# Patient Record
Sex: FEMALE | Race: WHITE | NOT HISPANIC OR LATINO | Employment: UNEMPLOYED | URBAN - METROPOLITAN AREA
[De-identification: names, ages, dates, MRNs, and addresses within clinical notes are randomized per-mention and may not be internally consistent; named-entity substitution may affect disease eponyms.]

---

## 2021-10-18 ENCOUNTER — HOSPITAL ENCOUNTER (OUTPATIENT)
Dept: ORTHOPEDICS | Facility: HOSPITAL | Age: 36
End: 2021-10-21
Attending: INTERNAL MEDICINE | Admitting: INTERNAL MEDICINE

## 2021-10-18 LAB
ABS NEUT (OLG): 7.15 X10(3)/MCL (ref 2.1–9.2)
ALBUMIN SERPL-MCNC: 3.9 GM/DL (ref 3.5–5)
ALBUMIN/GLOB SERPL: 0.9 RATIO (ref 1.1–2)
ALP SERPL-CCNC: 74 UNIT/L (ref 40–150)
ALT SERPL-CCNC: 14 UNIT/L (ref 0–55)
AST SERPL-CCNC: 19 UNIT/L (ref 5–34)
BASOPHILS # BLD AUTO: 0.05 X10(3)/MCL (ref 0–0.2)
BASOPHILS NFR BLD AUTO: 0.5 % (ref 0–1)
BILIRUB SERPL-MCNC: 0.3 MG/DL (ref 0.2–1.2)
BILIRUBIN DIRECT+TOT PNL SERPL-MCNC: 0.1 MG/DL (ref 0–0.8)
BILIRUBIN DIRECT+TOT PNL SERPL-MCNC: 0.2 MG/DL (ref 0–0.5)
BUN SERPL-MCNC: 12.6 MG/DL (ref 7–18.7)
CALCIUM SERPL-MCNC: 10.2 MG/DL (ref 8.4–10.2)
CHLORIDE SERPL-SCNC: 103 MMOL/L (ref 98–107)
CO2 SERPL-SCNC: 25 MMOL/L (ref 22–29)
CREAT SERPL-MCNC: 1.06 MG/DL (ref 0.57–1.11)
EOSINOPHIL # BLD AUTO: 0.23 X10(3)/MCL (ref 0–0.9)
EOSINOPHIL NFR BLD AUTO: 2.4 % (ref 0–6.4)
ERYTHROCYTE [DISTWIDTH] IN BLOOD BY AUTOMATED COUNT: 15.2 % (ref 11.5–17)
GLOBULIN SER-MCNC: 4.5 GM/DL (ref 2.4–3.5)
GLUCOSE SERPL-MCNC: 84 MG/DL (ref 74–100)
HCT VFR BLD AUTO: 40.2 % (ref 37–47)
HGB BLD-MCNC: 12.6 GM/DL (ref 12–16)
IMM GRANULOCYTES # BLD AUTO: 0.03 10*3/UL (ref 0–0.02)
IMM GRANULOCYTES NFR BLD AUTO: 0.3 % (ref 0–0.43)
LACTATE SERPL-SCNC: 1.4 MMOL/L (ref 0.5–2.2)
LYMPHOCYTES # BLD AUTO: 1.89 X10(3)/MCL (ref 0.6–4.6)
LYMPHOCYTES NFR BLD AUTO: 19.4 % (ref 16–44)
MCH RBC QN AUTO: 27.7 PG (ref 27–31)
MCHC RBC AUTO-ENTMCNC: 31.3 GM/DL (ref 33–36)
MCV RBC AUTO: 88.4 FL (ref 80–94)
MONOCYTES # BLD AUTO: 0.4 X10(3)/MCL (ref 0.1–1.3)
MONOCYTES NFR BLD AUTO: 4.1 % (ref 4–12.1)
NEUTROPHILS # BLD AUTO: 7.15 X10(3)/MCL (ref 2.1–9.2)
NEUTROPHILS NFR BLD AUTO: 73.3 % (ref 43–73)
NRBC BLD AUTO-RTO: 0 % (ref 0–0.2)
PLATELET # BLD AUTO: 319 X10(3)/MCL (ref 130–400)
PMV BLD AUTO: 10.2 FL (ref 7.4–10.4)
POTASSIUM SERPL-SCNC: 4 MMOL/L (ref 3.5–5.1)
PROT SERPL-MCNC: 8.4 GM/DL (ref 6.4–8.3)
RBC # BLD AUTO: 4.55 X10(6)/MCL (ref 4.2–5.4)
SARS-COV-2 AG RESP QL IA.RAPID: NEGATIVE
SODIUM SERPL-SCNC: 138 MMOL/L (ref 136–145)
WBC # SPEC AUTO: 9.8 X10(3)/MCL (ref 4.5–11.5)

## 2021-10-20 LAB
BUN SERPL-MCNC: 12.3 MG/DL (ref 7–18.7)
CREAT SERPL-MCNC: 0.86 MG/DL (ref 0.57–1.11)
EST CREAT CLEARANCE SER (OHS): 93.27 ML/MIN
VANCOMYCIN TROUGH SERPL-MCNC: 18 UG/ML (ref 5–10)

## 2021-10-23 LAB
FINAL CULTURE: NORMAL
FINAL CULTURE: NORMAL

## 2022-04-30 NOTE — ED PROVIDER NOTES
Patient:   Jovon Ramirez            MRN: [id number]            FIN: [id number]               Age:   36 years     Sex:  Female     :  1985   Associated Diagnoses:   Cellulitis of left lower extremity   Author:   Suyapa Ludwig MD      Basic Information   Additional information: Chief Complaint from Nursing Triage Note : Chief Complaint   10/18/2021 15:18 CDT     Chief Complaint           pt c/o pain and redness to left lower leg onsete this morning.  .      History of Present Illness   The patient presents with cellulitis and Ms Ramirez is a 36-year-old female complaining of recurrent cellulitis of her left lower leg.  I saw her in 2021 for this problem, and she had taken a course of Bactrim with some improvement and I gave her clindamycin which improved it further but it never completely resolved.  She states she stayed with a low level of infection to her LLL since March, until today when it flared up and became severely red and painful again.  She has no fever but states she is just not felt well in general.  She denies any injury to the area.  The onset was 1  days ago.  The course/duration of symptoms is constant.  Location: Left lower extremity. The character of symptoms is pain, redness and swelling.  The degree of symptoms is severe.  Risk factors consist of Obesity.  Prior episodes: frequent.  Therapy today: none.  Associated symptoms: none.        Review of Systems   Musculoskeletal symptoms:  LLL pain, redness and swelling.             Additional review of systems information: All other systems reviewed and otherwise negative.      Health Status   Allergies:    Allergic Reactions (Selected)  No Known Allergies,    Allergies (1) Active Reaction  No Known Allergies None Documented  .   Medications:  (Selected)   Documented Medications  Documented  Latuda 60 mg oral tablet: 60 mg = 1 tab(s), Oral, qPM.      Past Medical/ Family/ Social History   Medical history:     Active  Psoriasis (44860676)  Seizure disorder ([id number]), Reviewed as documented in chart.   Surgical history:    ear sx x 3.  eye x 2.  cholecystectomy.  tonsils and adenoids..  renal stent w/ lithotripsy., Reviewed as documented in chart.   Family history: Not significant.   Social history: Tobacco use: Regularly.   Problem list:    Active Problems (6)  Arthritis   Morbid obesity   Psoriasis   Seizure   Seizure disorder   Tobacco user   , per nurse's notes.      Physical Examination               Vital Signs   Vital Signs   10/18/2021 15:18 CDT     Temperature Temporal Artery               36.8 DegC                             Peripheral Pulse Rate     81 bpm                             Respiratory Rate          20 br/min                             SpO2                      98 %                             Oxygen Therapy            Room air                             Systolic Blood Pressure   138 mmHg                             Diastolic Blood Pressure  79 mmHg  .      Vital Signs (last 24 hrs)_____  Last Charted___________  Heart Rate Peripheral   81 bpm  (OCT 18 15:18)  Resp Rate         20 br/min  (OCT 18 15:18)  SBP      138 mmHg  (OCT 18 15:18)  DBP      79 mmHg  (OCT 18 15:18)  SpO2      98 %  (OCT 18 15:18)  .   Measurements   10/18/2021 15:18 CDT     Weight Dosing             150 kg                             Weight Measured and Calculated in Lbs     330.69 lb                             Weight Estimated          150 kg                             Height/Length Dosing      175 cm                             Height/Length Estimated   175 cm                             Body Mass Index Estimated 48.98 kg/m2  .   Oxygen saturation.   General:  Alert, no acute distress.    Skin:  Warm, dry, pink, intact.    Head:  Normocephalic, atraumatic.    Neck:  No JVD.   Eye   Ears, nose, mouth and throat:  Oral mucosa moist.   Cardiovascular   Respiratory:  Respirations are non-labored.   Musculoskeletal:  Bright  red erythema, heat, swelling to the left anterior shin from the ankle to just below the tibial tuberosity.  Erythema does not extend to the posterior calf.  She has areas of scar tissue in the left upper leg from previous healed infection.  No palpable abscess or foreign body.  Pedal pulses 2+ and shows no sign of infection to her foot with brisk capillary refill to the toes.  Negative Homans sign and no palpable no sign of DVT..   Neurological:  Alert and oriented to person, place, time, and situation.   Psychiatric:  Cooperative.      Medical Decision Making   Documents reviewed:  Emergency department nurses' notes.   Orders  Launch Orders   Laboratory:  UPT - Urine Pregancy Test (Order): Stat collect, Urine, 10/18/2021 16:05 CDT by ~;N, Nurse collect, 10/18/2021 16:05 CDT  CMP (Order): Stat collect, 10/18/2021 16:05 CDT, Blood, Lab Collect, 10/18/2021 16:05 CDT  CBC w/ Auto Diff (Order): Now collect, 10/18/2021 16:05 CDT, Blood, Lab Collect, 10/18/2021 16:05 CDT  Lactic Acid (Order): Stat collect, 10/18/2021 16:04 CDT, Blood, Lab Collect, 10/18/2021 16:04 CDT  Blood Culture (Order): 10/18/2021 16:04 CDT, Stat collect, Blood  Blood Culture (Order): 10/18/2021 16:04 CDT, Stat collect, Blood  Patient Care:  Saline Lock Insert (Order): 10/18/2021 16:05 CDT  Radiology:  XR Tibia-Fibula Left 2 Views (Order): Stat, 10/18/2021 16:05 CDT, Inflammation, None, Stretcher, Rad Type, Not Scheduled, Launch Orders   Pharmacy:  Zosyn 3 g-0.375 g/50 mL intravenous solution (Order): 3 gm, form: Infusion Skin/Skin structure infections, IV Piggyback, q6hr, first dose 10/18/2021 17:00 CDT.    Results review:  Lab results : Lab View   10/18/2021 18:06 CDT     COVID-19 Rapid            NEGATIVE    10/18/2021 17:25 CDT     Est Creat Clearance Ser   75.67 mL/min    10/18/2021 16:50 CDT     Sodium Lvl                138 mmol/L                             Potassium Lvl             4.0 mmol/L                             Chloride                   103 mmol/L                             CO2                       25 mmol/L                             Calcium Lvl               10.2 mg/dL                             Glucose Lvl               84 mg/dL                             BUN                       12.6 mg/dL                             Creatinine                1.06 mg/dL                             eGFR-AA                   >60  NA                             eGFR-CRESCENCIO                  >60 mL/min/1.73 m2  NA                             Bili Total                0.3 mg/dL                             Bili Direct               0.2 mg/dL                             Bili Indirect             0.10 mg/dL                             AST                       19 unit/L                             ALT                       14 unit/L                             Alk Phos                  74 unit/L                             Total Protein             8.4 gm/dL  HI                             Albumin Lvl               3.9 gm/dL                             Globulin                  4.5 gm/dL  HI                             A/G Ratio                 0.9 ratio  LOW                             Lactic Acid Lvl           1.4 mmol/L                             WBC                       9.8 x10(3)/mcL                             RBC                       4.55 x10(6)/mcL                             Hgb                       12.6 gm/dL                             Hct                       40.2 %                             Platelet                  319 x10(3)/mcL                             MCV                       88.4 fL                             MCH                       27.7 pg                             MCHC                      31.3 gm/dL  LOW                             RDW                       15.2 %                             MPV                       10.2 fL                             Abs Neut                  7.15 x10(3)/mcL                              Neutro Auto               73.3 %  HI                             Lymph Auto                19.4 %                             Mono Auto                 4.1 %                             Eos Auto                  2.4 %                             Abs Eos                   0.23 x10(3)/mcL                             Basophil Auto             0.5 %                             Abs Neutro                7.15 x10(3)/mcL                             Abs Lymph                 1.89 x10(3)/mcL                             Abs Mono                  0.40 x10(3)/mcL                             Abs Baso                  0.05 x10(3)/mcL                             NRBC%                     0.0 %                             IG%                       0.300 %                             IG#                       0.0300  HI  .   Radiology results:  Xray Tib Fib:  No subcu emphysema or bony abnormality.      Impression and Plan   Diagnosis   Cellulitis of left lower extremity ([id number]-NX L03.116)      Calls-Consults   -  Mike CARTAGENA, Zoltan RIVERA, Admit to the Hospitalist Service for IV antibiotics due to the severity of the infection.    Plan   Condition: Stable.    Disposition: Admit time  10/18/2021 18:06:00, Admit to Inpatient Unit.    Counseled: Family, Regarding diagnosis, Regarding diagnostic results, Regarding treatment plan, Patient indicated understanding of instructions.

## 2022-04-30 NOTE — H&P
Patient:   Jovon Ramirez            MRN: [id number]            FIN: [id number]               Age:   36 years     Sex:  Female     :  1985   Associated Diagnoses:   Seizure; Morbid obesity; Cellulitis of left lower extremity   Author:   Edvin Watts MD      Basic Information   Admit information:  A 36 year old female seen [facility] with a nurse bedside and informed consent with c/o l leg wound along with increased redness to the l leg along with warmth and pain was triaged and evaluated in the er and admitted to acute care for cellulitis of l leg .    Source of history:  Self.    Referral source:  Self.    History limitation:  None.       Chief Complaint   10/18/2021 15:18 CDT     pt c/o pain and redness to left lower leg onsete this morning.        Review of Systems   Constitutional:  Weakness, Fatigue, Decreased activity.    Eye:  Negative.    Ear/Nose/Mouth/Throat:  Negative.    Respiratory:  Negative.    Cardiovascular:  Negative.    Gastrointestinal:  Negative.    Genitourinary:  Negative.    Hematology/Lymphatics:  Negative.    Endocrine:  Negative.    Immunologic:  Recurrent infections, Malaise.    Musculoskeletal:  Muscle pain, Decreased range of motion.    Integumentary:  Skin lesion, redness warmth .    Neurologic:  Negative.    Psychiatric:  Negative.       Health Status   Allergies:    Allergic Reactions (All)  No Known Allergies,    Allergies (1) Active Reaction  No Known Allergies None Documented     Current medications:  (Selected)   Inpatient Medications  Ordered  Zosyn 3.375 gm (for IVPB): 3.375 gm, form: Injection Skin/Skin structure infections, IV Piggyback, q8hr, Infuse over: 4 hr, first dose 10/18/21 17:00:00 CDT  Documented Medications  Documented  Latuda 60 mg oral tablet: 60 mg = 1 tab(s), Oral, qPM,    Medications (1) Active  Scheduled: (1)  piperacillin-tazobactam  3.375 gm 1 EA, IV Piggyback, q8hr  Continuous: (0)  PRN: (0)     Problem list:    All  Problems  Arthritis / SNOMED CT 7581301 / Confirmed  Morbid obesity / SNOMED CT 001897227 / Probable  Psoriasis / SNOMED CT 43609941 / Confirmed  Seizure / SNOMED CT 008069028 / Confirmed  Seizure disorder / SNOMED CT 426567867 / Confirmed  Tobacco user / SNOMED CT 791228564 / Confirmed,    Active Problems (6)  Arthritis   Morbid obesity   Psoriasis   Seizure   Seizure disorder   Tobacco user         Histories   Past Medical History:    Active  Psoriasis (19392443)  Seizure disorder (195008107)   Family History:    No family history items have been selected or recorded.   Procedure history:    ear sx x 3.  eye x 2.  cholecystectomy.  tonsils and adenoids..  renal stent w/ lithotripsy.   Social History        Social & Psychosocial Habits    Alcohol  04/22/2018  Use: Never    Substance Use  04/22/2018  Use: Never    07/07/2019  Type: Marijuana, Methamphetamines    Frequency: 3-5 times per week    08/22/2019  Use: Current    Type: Marijuana    Frequency: 3-5 times per week    Tobacco  04/22/2018  Use: Current every day smoker    Type: Cigarettes    Tobacco use per day: 20    05/26/2019  Use: 10 or more cigarettes (1/    Type: Cigarettes    Patient Wants Consult For Cessation Counseling N/A    08/22/2019  Use: Never (less than 100 in l    Patient Wants Consult For Cessation Counseling No    11/05/2019  Use: 10 or more cigarettes (1/    Patient Wants Consult For Cessation Counseling No    02/05/2021  Use: 10 or more cigarettes (1/    Patient Wants Consult For Cessation Counseling No    03/02/2021  Use: 10 or more cigarettes (1/    Patient Wants Consult For Cessation Counseling No    10/18/2021  Use: Never (less than 100 in l    Patient Wants Consult For Cessation Counseling N/A    Abuse/Neglect  07/07/2019  SHX Any signs of abuse or neglect No    08/22/2019  SHX Any signs of abuse or neglect No    11/05/2019  SHX Any signs of abuse or neglect No    02/05/2021  SHX Any signs of abuse or neglect No    Feels unsafe at home:  No    Safe place to go: Yes    03/02/2021  SHX Any signs of abuse or neglect No    Feels unsafe at home: No    Safe place to go: Yes    10/18/2021  SHX Any signs of abuse or neglect No    Feels unsafe at home: No    Safe place to go: Yes  .        Physical Examination   General:  Alert and oriented.    Eye:  Pupils are equal, round and reactive to light.    HENT:  Normocephalic.    Neck:  Supple.    Respiratory:  Lungs are clear to auscultation.    Cardiovascular:  Normal rate.    Gastrointestinal:  Soft.    Vital Signs   10/18/2021 18:57 CDT     Temperature Oral          36.9 DegC                             Temperature Oral (calculated)             98.42 DegF                             Peripheral Pulse Rate     70 bpm                             Respiratory Rate          20 br/min                             SpO2                      100 %                             Systolic Blood Pressure   110 mmHg                             Diastolic Blood Pressure  62 mmHg    10/18/2021 15:18 CDT     Temperature Temporal Artery               36.8 DegC                             Peripheral Pulse Rate     81 bpm                             Respiratory Rate          20 br/min                             SpO2                      98 %                             Oxygen Therapy            Room air                             Systolic Blood Pressure   138 mmHg                             Diastolic Blood Pressure  79 mmHg        Vital Signs (last 24 hrs)_____  Last Charted___________  Temp Oral     36.9 DegC  (OCT 18 18:57)  Heart Rate Peripheral   70 bpm  (OCT 18 18:57)  Resp Rate         20 br/min  (OCT 18 18:57)  SBP      110 mmHg  (OCT 18 18:57)  DBP      62 mmHg  (OCT 18 18:57)  SpO2      100 %  (OCT 18 18:57)     Genitourinary:  No costovertebral angle tenderness.    Lymphatics:  No lymphadenopathy neck, axilla, groin.    Musculoskeletal:  Normal range of motion.    Integumentary:  redness wramth swelling l leg.     Neurologic:  Alert.    Cognition and Speech:  Oriented.    Psychiatric:  Cooperative.       Review / Management   Laboratory Results   Today's Lab Results : PowerNote Discrete Results   10/18/2021 17:25 CDT     Est Creat Clearance Ser   75.67 mL/min    10/18/2021 16:50 CDT     WBC                       9.8 x10(3)/mcL                             RBC                       4.55 x10(6)/mcL                             Hgb                       12.6 gm/dL                             Hct                       40.2 %                             Platelet                  319 x10(3)/mcL                             MCV                       88.4 fL                             MCH                       27.7 pg                             MCHC                      31.3 gm/dL  LOW                             RDW                       15.2 %                             MPV                       10.2 fL                             Abs Neut                  7.15 x10(3)/mcL                             Neutro Auto               73.3 %  HI                             Lymph Auto                19.4 %                             Mono Auto                 4.1 %                             Eos Auto                  2.4 %                             Abs Eos                   0.23 x10(3)/mcL                             Basophil Auto             0.5 %                             Abs Neutro                7.15 x10(3)/mcL                             Abs Lymph                 1.89 x10(3)/mcL                             Abs Mono                  0.40 x10(3)/mcL                             Abs Baso                  0.05 x10(3)/mcL                             NRBC%                     0.0 %                             IG%                       0.300 %                             IG#                       0.0300  HI                             Sodium Lvl                138 mmol/L                             Potassium Lvl             4.0 mmol/L                              Chloride                  103 mmol/L                             CO2                       25 mmol/L                             Calcium Lvl               10.2 mg/dL                             Glucose Lvl               84 mg/dL                             BUN                       12.6 mg/dL                             Creatinine                1.06 mg/dL                             eGFR-AA                   >60  NA                             eGFR-CRESCENCIO                  >60 mL/min/1.73 m2  NA                             Bili Total                0.3 mg/dL                             Bili Direct               0.2 mg/dL                             Bili Indirect             0.10 mg/dL                             AST                       19 unit/L                             ALT                       14 unit/L                             Alk Phos                  74 unit/L                             Total Protein             8.4 gm/dL  HI                             Albumin Lvl               3.9 gm/dL                             Globulin                  4.5 gm/dL  HI                             A/G Ratio                 0.9 ratio  LOW                             Lactic Acid Lvl           1.4 mmol/L        Radiology results   Rad Results (ST)   Accession: [id number]  Order: XR Tibia-Fibula Left 2 Views  Report Dt/Tm: 10/18/2021 16:32  Report:   Clinical History:  Inflammation     Technique:  2 views of the left tibia and fibula.     Comparison:  No prior imaging available for comparison.     Findings:  No acute osseous abnormality. No cyst displaced fracture. No  significant degenerative change. Soft tissue edema is noted with  prominent soft tissues.     Impression:  Soft tissue edema with no underlying osseous abnormality.       Condition:  Stable.       Impression and Plan   Diagnosis     Seizure ([id number] R56.9).     Morbid obesity (NJX88-IT E66.01).     Cellulitis of left lower extremity  (LBP00-RD L03.116).     Course:  Progressing as expected.    Orders      (Selected)   Inpatient Orders  Ordered  Admit as Inpatient: 10/18/21 18:06:00 CDT, Medical Unit Mike CARTAGENA, Ashly Klein, [facility]  Capacity Management Bed Request: 10/18/21 18:06:30 CDT, Medical Unit  Discharge Planning Ongoing Assessment: 10/21/21 9:00:00 CDT, q3day  Immunizations Quality Measures: 10/18/21 18:15:35 CDT, qShift  Patient Isolation: 10/18/21 18:05:36 CDT, Contact Precautions, Constant Indicator  Patient Isolation: 10/18/21 18:05:36 CDT, Droplet Precautions, Constant Indicator  Saline Lock Insert: 10/18/21 16:05:00 CDT, Stop date 10/18/21 16:05:00 CDT  VTE Quality Measures: 10/18/21 18:15:35 CDT, qShift  Zosyn 3.375 gm (for IVPB): 3.375 gm, form: Injection Skin/Skin structure infections, IV Piggyback, q8hr, Infuse over: 4 hr, first dose 10/18/21 17:00:00 CDT  Ordered (Dispatched)  UPT - Urine Pregancy Test: Stat collect, Urine, 10/18/21 16:05:00 CDT by ~;N, Stop date 10/18/21 16:05:00 CDT, Nurse collect, 10/18/21 16:05:00 CDT  Ordered (In Transit)  Blood Culture: 10/18/21 16:04:00 CDT, Stat collect, Blood, Stop date 10/18/21 16:05:00 CDT  Blood Culture: 10/18/21 16:04:00 CDT, Stat collect, Blood, Stop date 10/18/21 16:05:00 CDT.     Education and Follow-up:          Counseled: Patient, Regarding diagnosis, Regarding treatment, Regarding medications.         Discharge Planning: Plan to discharge ( To home ), Cellulitis, Adult.       Quality Measures

## 2022-07-21 ENCOUNTER — HOSPITAL ENCOUNTER (EMERGENCY)
Facility: HOSPITAL | Age: 37
Discharge: HOME | End: 2022-07-21
Attending: FAMILY MEDICINE
Payer: MEDICAID

## 2022-07-21 VITALS
TEMPERATURE: 98 F | RESPIRATION RATE: 18 BRPM | WEIGHT: 293 LBS | HEIGHT: 69 IN | SYSTOLIC BLOOD PRESSURE: 130 MMHG | DIASTOLIC BLOOD PRESSURE: 90 MMHG | HEART RATE: 84 BPM | BODY MASS INDEX: 43.4 KG/M2 | OXYGEN SATURATION: 97 %

## 2022-07-21 DIAGNOSIS — L03.116 CELLULITIS OF LEFT LOWER EXTREMITY: Primary | ICD-10-CM

## 2022-07-21 LAB
ALBUMIN SERPL-MCNC: 3.6 GM/DL (ref 3.5–5)
ALBUMIN/GLOB SERPL: 0.9 RATIO (ref 1.1–2)
ALP SERPL-CCNC: 70 UNIT/L (ref 40–150)
ALT SERPL-CCNC: 34 UNIT/L (ref 0–55)
AMORPH URATE CRY URNS QL MICRO: ABNORMAL /HPF
APPEARANCE UR: ABNORMAL
AST SERPL-CCNC: 33 UNIT/L (ref 5–34)
B-HCG SERPL QL: NEGATIVE
BACTERIA #/AREA URNS AUTO: ABNORMAL /HPF
BASOPHILS # BLD AUTO: 0.04 X10(3)/MCL (ref 0–0.2)
BASOPHILS NFR BLD AUTO: 0.4 %
BILIRUB UR QL STRIP.AUTO: NEGATIVE MG/DL
BILIRUBIN DIRECT+TOT PNL SERPL-MCNC: 0.3 MG/DL
BNP BLD-MCNC: 15.1 PG/ML
BUN SERPL-MCNC: 9.1 MG/DL (ref 7–18.7)
CALCIUM SERPL-MCNC: 9.7 MG/DL (ref 8.4–10.2)
CAOX CRY URNS QL MICRO: ABNORMAL /HPF
CHLORIDE SERPL-SCNC: 107 MMOL/L (ref 98–107)
CO2 SERPL-SCNC: 24 MMOL/L (ref 22–29)
COLOR UR AUTO: YELLOW
CREAT SERPL-MCNC: 1.07 MG/DL (ref 0.55–1.02)
EOSINOPHIL # BLD AUTO: 0.23 X10(3)/MCL (ref 0–0.9)
EOSINOPHIL NFR BLD AUTO: 2.6 %
ERYTHROCYTE [DISTWIDTH] IN BLOOD BY AUTOMATED COUNT: 15.2 % (ref 11.5–17)
GLOBULIN SER-MCNC: 4.1 GM/DL (ref 2.4–3.5)
GLUCOSE SERPL-MCNC: 81 MG/DL (ref 74–100)
GLUCOSE UR QL STRIP.AUTO: NEGATIVE MG/DL
HCT VFR BLD AUTO: 34.4 % (ref 37–47)
HGB BLD-MCNC: 10.7 GM/DL (ref 12–16)
IMM GRANULOCYTES # BLD AUTO: 0.05 X10(3)/MCL (ref 0–0.04)
IMM GRANULOCYTES NFR BLD AUTO: 0.6 %
KETONES UR QL STRIP.AUTO: NEGATIVE MG/DL
LEUKOCYTE ESTERASE UR QL STRIP.AUTO: ABNORMAL UNIT/L
LYMPHOCYTES # BLD AUTO: 1.87 X10(3)/MCL (ref 0.6–4.6)
LYMPHOCYTES NFR BLD AUTO: 20.9 %
MCH RBC QN AUTO: 28.2 PG (ref 27–31)
MCHC RBC AUTO-ENTMCNC: 31.1 MG/DL (ref 33–36)
MCV RBC AUTO: 90.5 FL (ref 80–94)
MONOCYTES # BLD AUTO: 0.43 X10(3)/MCL (ref 0.1–1.3)
MONOCYTES NFR BLD AUTO: 4.8 %
MUCOUS THREADS URNS QL MICRO: ABNORMAL /LPF
NEUTROPHILS # BLD AUTO: 6.3 X10(3)/MCL (ref 2.1–9.2)
NEUTROPHILS NFR BLD AUTO: 70.7 %
NITRITE UR QL STRIP.AUTO: NEGATIVE
NRBC BLD AUTO-RTO: 0 %
PH UR STRIP.AUTO: 6 [PH]
PLATELET # BLD AUTO: 339 X10(3)/MCL (ref 130–400)
PMV BLD AUTO: 10.2 FL (ref 7.4–10.4)
POTASSIUM SERPL-SCNC: 3.9 MMOL/L (ref 3.5–5.1)
PROT SERPL-MCNC: 7.7 GM/DL (ref 6.4–8.3)
PROT UR QL STRIP.AUTO: NEGATIVE MG/DL
RBC # BLD AUTO: 3.8 X10(6)/MCL (ref 4.2–5.4)
RBC #/AREA URNS AUTO: ABNORMAL /HPF
RBC UR QL AUTO: NEGATIVE UNIT/L
SODIUM SERPL-SCNC: 143 MMOL/L (ref 136–145)
SP GR UR STRIP.AUTO: >=1.03
SQUAMOUS #/AREA URNS AUTO: ABNORMAL /HPF
UROBILINOGEN UR STRIP-ACNC: 1 MG/DL
WBC # SPEC AUTO: 9 X10(3)/MCL (ref 4.5–11.5)
WBC #/AREA URNS AUTO: ABNORMAL /HPF

## 2022-07-21 PROCEDURE — 81025 URINE PREGNANCY TEST: CPT | Performed by: FAMILY MEDICINE

## 2022-07-21 PROCEDURE — 85025 COMPLETE CBC W/AUTO DIFF WBC: CPT | Performed by: FAMILY MEDICINE

## 2022-07-21 PROCEDURE — 96372 THER/PROPH/DIAG INJ SC/IM: CPT | Performed by: FAMILY MEDICINE

## 2022-07-21 PROCEDURE — 81001 URINALYSIS AUTO W/SCOPE: CPT | Performed by: FAMILY MEDICINE

## 2022-07-21 PROCEDURE — 63600175 PHARM REV CODE 636 W HCPCS: Performed by: FAMILY MEDICINE

## 2022-07-21 PROCEDURE — 99284 EMERGENCY DEPT VISIT MOD MDM: CPT | Mod: 25

## 2022-07-21 PROCEDURE — 80053 COMPREHEN METABOLIC PANEL: CPT | Performed by: FAMILY MEDICINE

## 2022-07-21 PROCEDURE — 83880 ASSAY OF NATRIURETIC PEPTIDE: CPT | Performed by: FAMILY MEDICINE

## 2022-07-21 PROCEDURE — 36415 COLL VENOUS BLD VENIPUNCTURE: CPT | Performed by: FAMILY MEDICINE

## 2022-07-21 RX ORDER — CEFTRIAXONE 1 G/1
1 INJECTION, POWDER, FOR SOLUTION INTRAMUSCULAR; INTRAVENOUS
Status: COMPLETED | OUTPATIENT
Start: 2022-07-21 | End: 2022-07-21

## 2022-07-21 RX ORDER — CEPHALEXIN 500 MG/1
500 CAPSULE ORAL EVERY 8 HOURS
Qty: 30 CAPSULE | Refills: 0 | Status: SHIPPED | OUTPATIENT
Start: 2022-07-21 | End: 2022-07-31

## 2022-07-21 RX ORDER — CLINDAMYCIN PHOSPHATE 150 MG/ML
600 INJECTION, SOLUTION INTRAVENOUS
Status: DISCONTINUED | OUTPATIENT
Start: 2022-07-21 | End: 2022-07-21

## 2022-07-21 RX ORDER — KETOROLAC TROMETHAMINE 30 MG/ML
30 INJECTION, SOLUTION INTRAMUSCULAR; INTRAVENOUS
Status: COMPLETED | OUTPATIENT
Start: 2022-07-21 | End: 2022-07-21

## 2022-07-21 RX ORDER — KETOROLAC TROMETHAMINE 10 MG/1
10 TABLET, FILM COATED ORAL EVERY 6 HOURS PRN
Qty: 20 TABLET | Refills: 0 | Status: SHIPPED | OUTPATIENT
Start: 2022-07-21 | End: 2022-07-26

## 2022-07-21 RX ORDER — CLINDAMYCIN HYDROCHLORIDE 300 MG/1
300 CAPSULE ORAL EVERY 8 HOURS
Qty: 30 CAPSULE | Refills: 0 | Status: SHIPPED | OUTPATIENT
Start: 2022-07-21 | End: 2022-07-31

## 2022-07-21 RX ADMIN — KETOROLAC TROMETHAMINE 30 MG: 30 INJECTION, SOLUTION INTRAMUSCULAR at 05:07

## 2022-07-21 RX ADMIN — CEFTRIAXONE SODIUM 1 G: 1 INJECTION, POWDER, FOR SOLUTION INTRAMUSCULAR; INTRAVENOUS at 05:07

## 2022-07-21 NOTE — ED PROVIDER NOTES
Encounter Date: 7/21/2022       History     Chief Complaint   Patient presents with    Cellulitis     Pt complaint of recurrent redness and swelling to left lower leg with past hx of admission for similar problem in Jan 2022.      37-year-old female with history of seizure disorder presents with nontraumatic left lower leg erythema and swelling for the past 1 week.  Patient is unsure if she was bitten by an insect.  She denies fever or drainage.  Patient states she had to be hospitalized last year for cellulitis.  Denies history of DVT or PE.  Denies chest pain or shortness of breath.  No other complaints.        Review of patient's allergies indicates:  No Known Allergies  Past Medical History:   Diagnosis Date    Kidney stones     Seizures      No past surgical history on file.  No family history on file.  Social History     Tobacco Use    Smoking status: Current Every Day Smoker     Packs/day: 1.00     Types: Cigarettes    Smokeless tobacco: Never Used     Review of Systems   Constitutional: Negative.    HENT: Negative.    Eyes: Negative.    Respiratory: Negative.    Cardiovascular: Negative.    Gastrointestinal: Negative.    Endocrine: Negative.    Genitourinary: Negative.    Musculoskeletal: Negative.    Skin: Negative.    Allergic/Immunologic: Negative.    Neurological: Negative.    Hematological: Negative.    Psychiatric/Behavioral: Negative.        Physical Exam     Initial Vitals   BP Pulse Resp Temp SpO2   07/21/22 1552 07/21/22 1552 07/21/22 1552 07/21/22 1557 07/21/22 1552   (!) 130/90 84 18 98.1 °F (36.7 °C) 97 %      MAP       --                Physical Exam    Nursing note and vitals reviewed.  Constitutional: She appears well-developed and well-nourished.   HENT:   Head: Normocephalic and atraumatic.   Eyes: Pupils are equal, round, and reactive to light.   Neck:   Normal range of motion.  Cardiovascular: Normal rate, regular rhythm and intact distal pulses.   Pulmonary/Chest: Breath sounds  normal.   Abdominal: Abdomen is soft. Bowel sounds are normal.   Musculoskeletal:         General: Normal range of motion.      Cervical back: Normal range of motion.      Comments: Left lower extremity-4 cm area of erythema superior to the ankle which is circumferential consistent with cellulitis.  No obvious abrasions or site for initial infection.  Warm to touch.  Mildly tender to palpation.  Nothing to drain.     Neurological: She is alert and oriented to person, place, and time. She has normal strength. GCS score is 15. GCS eye subscore is 4. GCS verbal subscore is 5. GCS motor subscore is 6.   Skin: Skin is warm. Capillary refill takes less than 2 seconds.   Psychiatric: She has a normal mood and affect.         ED Course   Procedures  Labs Reviewed   URINALYSIS - Abnormal; Notable for the following components:       Result Value    Appearance, UA Hazy (*)     Leukocyte Esterase, UA Trace (*)     All other components within normal limits   COMPREHENSIVE METABOLIC PANEL - Abnormal; Notable for the following components:    Creatinine 1.07 (*)     Globulin 4.1 (*)     Albumin/Globulin Ratio 0.9 (*)     All other components within normal limits   CBC WITH DIFFERENTIAL - Abnormal; Notable for the following components:    RBC 3.80 (*)     Hgb 10.7 (*)     Hct 34.4 (*)     MCHC 31.1 (*)     IG# 0.05 (*)     All other components within normal limits   URINALYSIS, MICROSCOPIC - Abnormal; Notable for the following components:    Bacteria, UA Few (*)     Mucous, UA Small (*)     Amporphous Urate Crystals, UA Few (*)     Calcium Oxalate Crystals, UA Few (*)     WBC, UA 6-10 (*)     Squamous Epithelial Cells, UA Many (*)     All other components within normal limits   PREGNANCY TEST, URINE RAPID - Normal   B-TYPE NATRIURETIC PEPTIDE - Normal   CBC W/ AUTO DIFFERENTIAL    Narrative:     The following orders were created for panel order CBC auto differential.  Procedure                               Abnormality          Status                     ---------                               -----------         ------                     CBC with Differential[[id number]]        Abnormal            Final result                 Please view results for these tests on the individual orders.          Imaging Results    None          Medications   ketorolac injection 30 mg (30 mg Intramuscular Given 7/21/22 1715)   cefTRIAXone injection 1 g (1 g Intramuscular Given 7/21/22 1715)     Medical Decision Making:   ED Management:  Patient is nontoxic-appearing and in no acute distress.  Vital signs stable.  Afebrile.  Physical exam is consistent with cellulitis.  No leukocytosis or left shift.  Suitable for a trial of outpatient treatment.  Patient was given Rocephin and Toradol in the ED.  Will continue clinda and Keflex outpatient.  Encouraged patient to keep the leg clean dry and elevated.  Call follow-up with her PCP as soon as possible.  Strict return to ER precautions given, patient voiced understanding.             ED Course as of 07/21/22 1831   Thu Jul 21, 2022   1749 Patient given Rocephin and Toradol in ED. [AG]      ED Course User Index  [AG] Chirag Pina MD             Clinical Impression:   Final diagnoses:  [L03.116] Cellulitis of left lower extremity (Primary)          ED Disposition Condition    Discharge Stable        ED Prescriptions     Medication Sig Dispense Start Date End Date Auth. Provider    clindamycin (CLEOCIN) 300 MG capsule Take 1 capsule (300 mg total) by mouth every 8 (eight) hours. for 10 days 30 capsule 7/21/2022 7/31/2022 Chirag Pina MD    cephALEXin (KEFLEX) 500 MG capsule Take 1 capsule (500 mg total) by mouth every 8 (eight) hours. for 10 days 30 capsule 7/21/2022 7/31/2022 Chirag Pina MD    ketorolac (TORADOL) 10 mg tablet Take 1 tablet (10 mg total) by mouth every 6 (six) hours as needed for Pain. 20 tablet 7/21/2022 7/26/2022 Chirag Pina MD        Follow-up Information     Follow up With  Specialties Details Why Contact Info    Lindsay Coon NP Family Medicine   [address] [facility] [id number]             Chirag Pina MD  07/21/22 5413

## 2022-07-21 NOTE — ED TRIAGE NOTES
Pt complaint of recurrent and worsening  redness and swelling to left lower leg for a week. Pt relates past hx of admission for similar problem in Jan 2022. Pt presents with circrmfrential  redness to left lower leg with swelling to area

## 2023-05-11 NOTE — DISCHARGE INSTRUCTIONS
Patient Education       Surgical Wound Discharge Instructions   About this topic   A surgical wound is a cut in the skin done by your doctor. It is also called an incision. A surgical cut is put back together carefully after surgery. The stitches or sutures can be above the skin or right under the skin. Some stitches need to be taken out. Others melt away or dissolve as the wound heals. In some cases, staples are used. These are only used to fix cuts on the outside of the skin. Sometimes, a special glue is used to hold the skin together, especially if the wound in on the face.     What will the results be?   Taking good care of your surgical wound may:  Help with healing  Lower the risk of infection  Lower pain  Prevent a large scar  What care is needed at home?   Any cut made on the skin gives germs easy access to cause an infection. A wound infection can start from 2 days to 3 weeks after surgery. The infection may spread deeper in the body if it is not treated. You may start to feel unwell and serious health problems could happen. An infection may also cause the cut to open up again. These things may help you prevent an infection:  Wash your hands before and after touching your cut site. Use soap and water for at least 20 seconds. Alcohol-based hand sanitizers also work to kill germs.  Keep your wound clean and dry for the first 48 hours. You can take a shower after 48 hours or when your doctor tells you to. You may use soap and water to wash your wound. Make sure not to soak it. Gently towel-dry the wound afterwards. May cover with dry gauze if draining.   Take the medications your doctor may prescribe you as ordered.  No heavy lifting over 10 pounds.  No soaking in the tub, hot tub, or swimming.  Do not put any ointments, creams, lotions, oils on your incision until its healed.  What follow-up care is needed?   If you have stitches or staples, you will need to have them taken out. Your doctor will often want to  do this in 1 to 2 weeks. Your doctor may also use skin glue or special tape to close your wound. Do not take the glue or tape off until your doctor says you can.  What lifestyle changes are needed?   Stop or lessen smoking.  Get lots of rest. Sleep when you are feeling tired. Avoid doing tiring activities.  Follow a healthy diet.  Eat many different foods rich in nutrients from all food groups.   Stay away from sugars and fats. Limit sweets and fatty foods such as desserts, fried foods, and chips. Use good fats found in fish, nuts, avocados, and oils, like olive oil and canola oil. Cut back on solid fats (butter, lard, margarine).  Try to eat more low fat or lean meats. Eat less red meat and eat more fish, chicken, turkey, and beans instead.  Drink 6 to 8 cups (1440 to 1920 mL) of water each day unless your doctor has told you to limit your fluids.  Avoid swimming and hot tubs until incision is healed.  Will physical activity be limited?   Movement may be limited if your wound is in an area that you use a lot. These areas are more likely to break open. Take extra care if you have a wound on a place like your hand, elbow, or knee.  Avoid stretching and pulling activities. These may cause your scar to pull apart. Call your doctor if this happens.  Ask your doctor when it is safe for you to do things like run, work out, or play sports.   What problems could happen?   Infection of the whole body. This is sepsis.  Poorly healed wound may leave big scars.  The wound may spontaneously open or break away from the stitches or staples. This is wound dehiscence.   When do I need to call the doctor?   Signs of a very bad reaction. These include wheezing; chest tightness; fever; itching; bad cough; blue skin color; seizures; or swelling of face, lips, tongue, or throat. Go to the ER right away.  Signs of infection. These include a fever of 100.4°F (38°C) or higher, chills, wound that will not heal, pain.  Signs of wound  infection. These include swelling, redness, warmth around the wound; too much pain when touched; yellowish, greenish, or bloody discharge; foul smell coming from the cut site; cut site opens up.  Helpful tips   Wear loose clothing. Good blood supply is important for healing. You may also be more comfortable.  Do not remove your bandages unless your doctor says so.  If your wound suddenly bleeds, apply pressure to help stop the bleeding. See you doctor right away.  If you have high blood sugar, work with your doctor to keep it in a normal range.

## 2023-05-12 ENCOUNTER — ANESTHESIA (OUTPATIENT)
Dept: SURGERY | Facility: HOSPITAL | Age: 38
End: 2023-05-12
Payer: MEDICAID

## 2023-05-12 ENCOUNTER — HOSPITAL ENCOUNTER (OUTPATIENT)
Facility: HOSPITAL | Age: 38
Discharge: HOME | End: 2023-05-12
Attending: SURGERY | Admitting: SURGERY
Payer: MEDICAID

## 2023-05-12 ENCOUNTER — ANESTHESIA EVENT (OUTPATIENT)
Dept: SURGERY | Facility: HOSPITAL | Age: 38
End: 2023-05-12
Payer: MEDICAID

## 2023-05-12 DIAGNOSIS — Z41.1 ELECTIVE PROCEDURE FOR UNACCEPTABLE COSMETIC APPEARANCE: Primary | ICD-10-CM

## 2023-05-12 LAB
B-HCG UR QL: NEGATIVE
CTP QC/QA: YES

## 2023-05-12 PROCEDURE — D9220A PRA ANESTHESIA: ICD-10-PCS | Mod: ANES,,, | Performed by: ANESTHESIOLOGY

## 2023-05-12 PROCEDURE — 36000706: Performed by: SURGERY

## 2023-05-12 PROCEDURE — 25000003 PHARM REV CODE 250: Performed by: NURSE ANESTHETIST, CERTIFIED REGISTERED

## 2023-05-12 PROCEDURE — 26727 PR PERCUT RX PROX/MID FING SHFT FX: ICD-10-PCS | Mod: F7,,, | Performed by: SURGERY

## 2023-05-12 PROCEDURE — 71000015 HC POSTOP RECOV 1ST HR: Performed by: SURGERY

## 2023-05-12 PROCEDURE — D9220A PRA ANESTHESIA: ICD-10-PCS | Mod: CRNA,,, | Performed by: NURSE ANESTHETIST, CERTIFIED REGISTERED

## 2023-05-12 PROCEDURE — D9220A PRA ANESTHESIA: Mod: CRNA,,, | Performed by: NURSE ANESTHETIST, CERTIFIED REGISTERED

## 2023-05-12 PROCEDURE — 71000033 HC RECOVERY, INTIAL HOUR: Performed by: SURGERY

## 2023-05-12 PROCEDURE — C1769 GUIDE WIRE: HCPCS | Performed by: SURGERY

## 2023-05-12 PROCEDURE — 01820 ANES CLSD PX RDS U/W/H BONES: CPT | Performed by: SURGERY

## 2023-05-12 PROCEDURE — D9220A PRA ANESTHESIA: Mod: ANES,,, | Performed by: ANESTHESIOLOGY

## 2023-05-12 PROCEDURE — 37000009 HC ANESTHESIA EA ADD 15 MINS: Performed by: SURGERY

## 2023-05-12 PROCEDURE — 26727 TREAT FINGER FRACTURE EACH: CPT | Mod: F7,,, | Performed by: SURGERY

## 2023-05-12 PROCEDURE — 36000707: Performed by: SURGERY

## 2023-05-12 PROCEDURE — 63600175 PHARM REV CODE 636 W HCPCS: Performed by: ANESTHESIOLOGY

## 2023-05-12 PROCEDURE — 81025 URINE PREGNANCY TEST: CPT | Performed by: ANESTHESIOLOGY

## 2023-05-12 PROCEDURE — 37000008 HC ANESTHESIA 1ST 15 MINUTES: Performed by: SURGERY

## 2023-05-12 PROCEDURE — 63600175 PHARM REV CODE 636 W HCPCS: Performed by: NURSE ANESTHETIST, CERTIFIED REGISTERED

## 2023-05-12 PROCEDURE — 25000003 PHARM REV CODE 250: Performed by: ANESTHESIOLOGY

## 2023-05-12 DEVICE — IMPLANTABLE DEVICE: Type: IMPLANTABLE DEVICE | Site: HAND | Status: FUNCTIONAL

## 2023-05-12 RX ORDER — ACETAMINOPHEN 500 MG
1000 TABLET ORAL
Status: DISCONTINUED | OUTPATIENT
Start: 2023-05-12 | End: 2023-05-12 | Stop reason: HOSPADM

## 2023-05-12 RX ORDER — PROCHLORPERAZINE EDISYLATE 5 MG/ML
5 INJECTION INTRAMUSCULAR; INTRAVENOUS EVERY 30 MIN PRN
Status: DISCONTINUED | OUTPATIENT
Start: 2023-05-12 | End: 2023-05-12 | Stop reason: HOSPADM

## 2023-05-12 RX ORDER — LIDOCAINE HYDROCHLORIDE 10 MG/ML
INJECTION, SOLUTION EPIDURAL; INFILTRATION; INTRACAUDAL; PERINEURAL
Status: DISCONTINUED | OUTPATIENT
Start: 2023-05-12 | End: 2023-05-12

## 2023-05-12 RX ORDER — SODIUM CHLORIDE 9 MG/ML
INJECTION, SOLUTION INTRAVENOUS CONTINUOUS
Status: CANCELLED | OUTPATIENT
Start: 2023-05-12

## 2023-05-12 RX ORDER — GABAPENTIN 300 MG/1
300 CAPSULE ORAL
Status: DISCONTINUED | OUTPATIENT
Start: 2023-05-12 | End: 2023-05-12 | Stop reason: HOSPADM

## 2023-05-12 RX ORDER — SODIUM CHLORIDE, SODIUM GLUCONATE, SODIUM ACETATE, POTASSIUM CHLORIDE AND MAGNESIUM CHLORIDE 30; 37; 368; 526; 502 MG/100ML; MG/100ML; MG/100ML; MG/100ML; MG/100ML
INJECTION, SOLUTION INTRAVENOUS CONTINUOUS
Status: CANCELLED | OUTPATIENT
Start: 2023-05-12 | End: 2023-06-11

## 2023-05-12 RX ORDER — ONDANSETRON 2 MG/ML
4 INJECTION INTRAMUSCULAR; INTRAVENOUS DAILY PRN
Status: DISCONTINUED | OUTPATIENT
Start: 2023-05-12 | End: 2023-05-12 | Stop reason: HOSPADM

## 2023-05-12 RX ORDER — HYDROCODONE BITARTRATE AND ACETAMINOPHEN 5; 325 MG/1; MG/1
1 TABLET ORAL
Status: CANCELLED | OUTPATIENT
Start: 2023-05-12

## 2023-05-12 RX ORDER — CEFAZOLIN SODIUM 1 G/3ML
INJECTION, POWDER, FOR SOLUTION INTRAMUSCULAR; INTRAVENOUS
Status: DISCONTINUED | OUTPATIENT
Start: 2023-05-12 | End: 2023-05-12

## 2023-05-12 RX ORDER — ONDANSETRON 4 MG/1
4 TABLET, ORALLY DISINTEGRATING ORAL
Status: COMPLETED | OUTPATIENT
Start: 2023-05-12 | End: 2023-05-12

## 2023-05-12 RX ORDER — MIDAZOLAM HYDROCHLORIDE 1 MG/ML
2 INJECTION INTRAMUSCULAR; INTRAVENOUS
Status: DISCONTINUED | OUTPATIENT
Start: 2023-05-12 | End: 2023-05-12 | Stop reason: HOSPADM

## 2023-05-12 RX ORDER — CEFAZOLIN SODIUM 2 G/50ML
2 SOLUTION INTRAVENOUS
Status: DISCONTINUED | OUTPATIENT
Start: 2023-05-12 | End: 2023-05-12 | Stop reason: HOSPADM

## 2023-05-12 RX ORDER — ONDANSETRON 2 MG/ML
4 INJECTION INTRAMUSCULAR; INTRAVENOUS EVERY 12 HOURS PRN
Status: CANCELLED | OUTPATIENT
Start: 2023-05-12

## 2023-05-12 RX ORDER — SEVOFLURANE 250 ML/250ML
LIQUID RESPIRATORY (INHALATION)
Status: DISCONTINUED
Start: 2023-05-12 | End: 2023-05-12 | Stop reason: HOSPADM

## 2023-05-12 RX ORDER — HYDROMORPHONE HYDROCHLORIDE 2 MG/ML
1 INJECTION, SOLUTION INTRAMUSCULAR; INTRAVENOUS; SUBCUTANEOUS EVERY 4 HOURS PRN
Status: CANCELLED | OUTPATIENT
Start: 2023-05-12

## 2023-05-12 RX ORDER — PROMETHAZINE HYDROCHLORIDE 25 MG/1
25 TABLET ORAL EVERY 6 HOURS PRN
Status: CANCELLED | OUTPATIENT
Start: 2023-05-12

## 2023-05-12 RX ORDER — SODIUM CHLORIDE, SODIUM LACTATE, POTASSIUM CHLORIDE, CALCIUM CHLORIDE 600; 310; 30; 20 MG/100ML; MG/100ML; MG/100ML; MG/100ML
1000 INJECTION, SOLUTION INTRAVENOUS ONCE
Status: COMPLETED | OUTPATIENT
Start: 2023-05-12 | End: 2023-05-12

## 2023-05-12 RX ORDER — HYDROCODONE BITARTRATE AND ACETAMINOPHEN 5; 325 MG/1; MG/1
1 TABLET ORAL EVERY 4 HOURS PRN
Status: CANCELLED | OUTPATIENT
Start: 2023-05-12

## 2023-05-12 RX ORDER — PROPOFOL 10 MG/ML
VIAL (ML) INTRAVENOUS
Status: DISCONTINUED | OUTPATIENT
Start: 2023-05-12 | End: 2023-05-12

## 2023-05-12 RX ORDER — CLONAZEPAM 1 MG/1
1 TABLET ORAL DAILY PRN
COMMUNITY
Start: 2023-05-01

## 2023-05-12 RX ORDER — ACETAMINOPHEN 325 MG/1
650 TABLET ORAL EVERY 4 HOURS PRN
Status: CANCELLED | OUTPATIENT
Start: 2023-05-12

## 2023-05-12 RX ORDER — DEXAMETHASONE SODIUM PHOSPHATE 4 MG/ML
INJECTION, SOLUTION INTRA-ARTICULAR; INTRALESIONAL; INTRAMUSCULAR; INTRAVENOUS; SOFT TISSUE
Status: DISCONTINUED | OUTPATIENT
Start: 2023-05-12 | End: 2023-05-12

## 2023-05-12 RX ORDER — CELECOXIB 200 MG/1
200 CAPSULE ORAL
Status: DISCONTINUED | OUTPATIENT
Start: 2023-05-12 | End: 2023-05-12 | Stop reason: HOSPADM

## 2023-05-12 RX ORDER — CEFAZOLIN 2 G/1
INJECTION, POWDER, FOR SOLUTION INTRAMUSCULAR; INTRAVENOUS
Status: COMPLETED
Start: 2023-05-12 | End: 2023-05-12

## 2023-05-12 RX ORDER — HYDROMORPHONE HYDROCHLORIDE 2 MG/ML
0.4 INJECTION, SOLUTION INTRAMUSCULAR; INTRAVENOUS; SUBCUTANEOUS EVERY 5 MIN PRN
Status: DISCONTINUED | OUTPATIENT
Start: 2023-05-12 | End: 2023-05-12 | Stop reason: HOSPADM

## 2023-05-12 RX ORDER — METHOCARBAMOL 100 MG/ML
INJECTION, SOLUTION INTRAMUSCULAR; INTRAVENOUS
Status: DISCONTINUED
Start: 2023-05-12 | End: 2023-05-12 | Stop reason: HOSPADM

## 2023-05-12 RX ORDER — FENTANYL CITRATE 50 UG/ML
INJECTION, SOLUTION INTRAMUSCULAR; INTRAVENOUS
Status: DISCONTINUED | OUTPATIENT
Start: 2023-05-12 | End: 2023-05-12

## 2023-05-12 RX ORDER — METHOCARBAMOL 100 MG/ML
1000 INJECTION, SOLUTION INTRAMUSCULAR; INTRAVENOUS ONCE
Status: COMPLETED | OUTPATIENT
Start: 2023-05-12 | End: 2023-05-12

## 2023-05-12 RX ORDER — MEPERIDINE HYDROCHLORIDE 25 MG/ML
6.25 INJECTION INTRAMUSCULAR; INTRAVENOUS; SUBCUTANEOUS ONCE AS NEEDED
Status: DISCONTINUED | OUTPATIENT
Start: 2023-05-12 | End: 2023-05-12 | Stop reason: HOSPADM

## 2023-05-12 RX ADMIN — SODIUM CHLORIDE, POTASSIUM CHLORIDE, SODIUM LACTATE AND CALCIUM CHLORIDE: 600; 310; 30; 20 INJECTION, SOLUTION INTRAVENOUS at 10:05

## 2023-05-12 RX ADMIN — ACETAMINOPHEN 1000 MG: 500 TABLET, FILM COATED ORAL at 09:05

## 2023-05-12 RX ADMIN — FENTANYL CITRATE 50 MCG: 50 INJECTION, SOLUTION INTRAMUSCULAR; INTRAVENOUS at 11:05

## 2023-05-12 RX ADMIN — DEXAMETHASONE SODIUM PHOSPHATE 4 MG: 4 INJECTION, SOLUTION INTRA-ARTICULAR; INTRALESIONAL; INTRAMUSCULAR; INTRAVENOUS; SOFT TISSUE at 11:05

## 2023-05-12 RX ADMIN — PROPOFOL 200 MG: 10 INJECTION, EMULSION INTRAVENOUS at 11:05

## 2023-05-12 RX ADMIN — CELECOXIB 200 MG: 200 CAPSULE ORAL at 09:05

## 2023-05-12 RX ADMIN — METHOCARBAMOL 1000 MG: 100 INJECTION INTRAMUSCULAR; INTRAVENOUS at 12:05

## 2023-05-12 RX ADMIN — MIDAZOLAM HYDROCHLORIDE 2 MG: 1 INJECTION, SOLUTION INTRAMUSCULAR; INTRAVENOUS at 09:05

## 2023-05-12 RX ADMIN — GABAPENTIN 300 MG: 300 CAPSULE ORAL at 09:05

## 2023-05-12 RX ADMIN — LIDOCAINE HYDROCHLORIDE 50 MG: 10 INJECTION, SOLUTION EPIDURAL; INFILTRATION; INTRACAUDAL; PERINEURAL at 11:05

## 2023-05-12 RX ADMIN — ONDANSETRON 4 MG: 4 TABLET, ORALLY DISINTEGRATING ORAL at 09:05

## 2023-05-12 RX ADMIN — SODIUM CHLORIDE, POTASSIUM CHLORIDE, SODIUM LACTATE AND CALCIUM CHLORIDE 1000 ML: 600; 310; 30; 20 INJECTION, SOLUTION INTRAVENOUS at 09:05

## 2023-05-12 RX ADMIN — CEFAZOLIN 2 G: 330 INJECTION, POWDER, FOR SOLUTION INTRAMUSCULAR; INTRAVENOUS at 10:05

## 2023-05-12 NOTE — ANESTHESIA PROCEDURE NOTES
Intubation    Date/Time: 5/12/2023 11:05 AM  Performed by: Kaylee Flores CRNA  Authorized by: Jennifer Heard MD     Intubation:     Induction:  Intravenous    Intubated:  Postinduction    Mask Ventilation:  Easy mask    Attempts:  1    Attempted By:  CRNA    Difficult Airway Encountered?: No      Complications:  None    Airway Device:  Supraglottic airway/LMA    Airway Device Size:  4.0    Style/Cuff Inflation:  Cuffed (inflated to minimal occlusive pressure)    Inflation Amount (mL):  30    Secured at:  The lips    Placement Verified By:  Capnometry    Complicating Factors:  None    Findings Post-Intubation:  BS equal bilateral

## 2023-05-12 NOTE — PLAN OF CARE
Patients discharge to her post-op room approved per Dr. Corley who is present at bedside and updated on her status, v/s, meds given.

## 2023-05-12 NOTE — TRANSFER OF CARE
"Anesthesia Transfer of Care Note    Patient: Jovon Ramirez    Procedure(s) Performed: Procedure(s) (LRB):  CLOSED REDUCTION, FINGER, WITH PINNING (ORIF vs percutaneous fixation right middle finger middle phalanx fracture, K-WIRE  AND JED HAND SET NEEDED) (Right)    Patient location: PACU    Anesthesia Type: general    Transport from OR: Transported from OR on room air with adequate spontaneous ventilation    Post pain: adequate analgesia    Post assessment: no apparent anesthetic complications    Post vital signs: stable    Level of consciousness: responds to stimulation    Nausea/Vomiting: no nausea/vomiting    Complications: none    Transfer of care protocol was followed      Last vitals:   Visit Vitals  /82   Pulse 68   Temp 36.6 °C (97.8 °F) (Oral)   Resp 20   Ht 5' 9" (1.753 m)   Wt 136.1 kg (300 lb)   LMP 05/11/2023 (Approximate)   SpO2 99%   Breastfeeding No   BMI 44.30 kg/m²     "

## 2023-05-12 NOTE — CARE UPDATE
Patient awakened,zazueta,rejected oral airway,oriented/reassured her, c/o of slight pain-will medicate per anesthesia orders, exchanging well.

## 2023-05-12 NOTE — OP NOTE
[facility] [address] [place] [address]    PATIENT NAME:      KATHRIN RODRIGUEZ  YOB: 1985  CSN:               [id number]  MRN:               [id number]  ADMIT DATE:        05/12/2023 08:05:00  PHYSICIAN:         Suleman Livingston MD                          OPERATIVE REPORT      DATE OF SURGERY:    05/12/2023 00:00:00    SURGEON:  Suleman Livingston MD    PREOPERATIVE DIAGNOSIS:  Middle finger middle phalanx fracture, right hand.    POSTOPERATIVE DIAGNOSIS:  Middle finger middle phalanx fracture, right hand.    PROCEDURE:  Closed reduction, percutaneous pinning, right middle finger middle   phalanx fracture.    INDICATION FOR PROCEDURE:  Kathrin Rodriguez is a 37-year-old female suffered a   closed fracture of her right middle phalanx of her middle finger.  She presents   for repair.    ANESTHESIA:  General.    COMPLICATIONS:  None.    PROCEDURE IN DETAIL:  The patient was endotracheally intubated, prepped and   draped in usual sterile fashion.  We used a bone reduction forceps to clamp the   distal aspect of the right middle finger middle phalanx to bring the fracture   into alignment.  Under fluoroscopic guidance, I placed 2 retrograde celi-cross   K-wires, 0.045 K-wires for excellent reduction.  The pins were then cut and a   sterile dressing including a finger splint was applied.  There were no   complications.  I was scrubbed and present for the entire procedure.        ______________________________  Suleman Livingston MD    BBF/AQS  DD:  05/12/2023  Time:  11:42AM  DT:  05/12/2023  Time:  03:04PM  Job #:  [id number]      OPERATIVE REPORT

## 2023-05-12 NOTE — DISCHARGE SUMMARY
[facility]  Discharge Note  Short Stay    Procedure(s) (LRB):  CLOSED REDUCTION, FINGER, WITH PINNING (ORIF vs percutaneous fixation right middle finger middle phalanx fracture, K-WIRE  AND JED HAND SET NEEDED) (Right)      OUTCOME: Patient tolerated treatment/procedure well without complication and is now ready for discharge.    DISPOSITION: Home or Self Care    FINAL DIAGNOSIS:  <principal problem not specified>    FOLLOWUP: In clinic    DISCHARGE INSTRUCTIONS:    Discharge Procedure Orders   Diet general     Keep surgical extremity elevated     Leave dressing on - Keep it clean, dry, and intact until clinic visit     Weight bearing restrictions (specify)   Order Comments: No heavy lifting, otherwise, activity as tolerated        TIME SPENT ON DISCHARGE:    minutes

## 2023-05-12 NOTE — ANESTHESIA PREPROCEDURE EVALUATION
05/12/2023  Jovon Ramirez is a 37 y.o., female with ----------------------------  Bipolar affective disorder  Kidney stones  Seizures  Super Morbid Obesity with BMI above 50 - 156kg  And ----------------------------  Tympanostomy tube placement    Presents for closed vs open reduction of right middle finger.      Pre-op Assessment    I have reviewed the NPO Status.      Review of Systems      Physical Exam  General: Well nourished, Cooperative, Alert and Oriented    Airway:  Mallampati: III   Mouth Opening: Normal  TM Distance: Normal  Tongue: Normal  Neck ROM: Normal ROM  Neck: Girth Increased    Dental:  Intact    Chest/Lungs:  Clear to auscultation, Normal Respiratory Rate    Heart:  Rate: Normal  Rhythm: Regular Rhythm        Anesthesia Plan  Type of Anesthesia, risks & benefits discussed:    Anesthesia Type: Gen Supraglottic Airway  Intra-op Monitoring Plan: Standard ASA Monitors  Post Op Pain Control Plan: multimodal analgesia and IV/PO Opioids PRN  Induction:  IV  Airway Plan: Direct, Post-Induction  Informed Consent: Informed consent signed with the Patient and all parties understand the risks and agree with anesthesia plan.  All questions answered.   ASA Score: 3  Day of Surgery Review of History & Physical: H&P Update referred to the surgeon/provider.  Anesthesia Plan Notes: Premedication: Midazolam  Special Technique: Preemptive analgesia with neurontin, zofran, acetaminophen and celebrex or tramadol  LMA with OG tube  PONV prophylaxis    Ready For Surgery From Anesthesia Perspective.     .

## 2023-05-12 NOTE — CARE UPDATE
Received patient from the OR-she is asleep, oral airway in place, chin lift not necessary at present-nurse remains at bedside with constant assessment and observation. Respirations full-regular-deep-clear,hob up 30 degrees.

## 2023-05-12 NOTE — ANESTHESIA POSTPROCEDURE EVALUATION
Anesthesia Post Evaluation    Patient: Jovon Ramirez    Procedure(s) Performed: Procedure(s) (LRB):  CLOSED REDUCTION, FINGER, WITH PINNING (ORIF vs percutaneous fixation right middle finger middle phalanx fracture, K-WIRE  AND JED HAND SET NEEDED) (Right)    Final Anesthesia Type: general      Patient location during evaluation: PACU  Patient participation: Yes- Able to Participate  Level of consciousness: awake and alert  Post-procedure vital signs: reviewed and stable  Pain management: adequate  Airway patency: patent    PONV status at discharge: No PONV  Anesthetic complications: no      Cardiovascular status: hemodynamically stable  Respiratory status: unassisted  Hydration status: euvolemic  Follow-up not needed.          Vitals Value Taken Time   /59 05/12/23 1212   Temp 36.4 °C (97.5 °F) 05/12/23 1144   Pulse 64 05/12/23 1214   Resp 16 05/12/23 1214   SpO2 91 % 05/12/23 1214   Vitals shown include unvalidated device data.      No case tracking events are documented in the log.      Pain/Jed Score: Pain Rating Prior to Med Admin: 7 (5/12/2023  9:05 AM)  Jed Score: 9 (5/12/2023 12:03 PM)

## 2023-05-13 VITALS
WEIGHT: 293 LBS | DIASTOLIC BLOOD PRESSURE: 81 MMHG | BODY MASS INDEX: 43.4 KG/M2 | OXYGEN SATURATION: 96 % | HEART RATE: 66 BPM | HEIGHT: 69 IN | RESPIRATION RATE: 18 BRPM | SYSTOLIC BLOOD PRESSURE: 104 MMHG | TEMPERATURE: 98 F

## 2023-12-28 ENCOUNTER — HOSPITAL ENCOUNTER (EMERGENCY)
Facility: HOSPITAL | Age: 38
Discharge: LEFT BEFORE DISCHARGE | End: 2023-12-28
Payer: MEDICAID

## 2023-12-28 VITALS
SYSTOLIC BLOOD PRESSURE: 120 MMHG | OXYGEN SATURATION: 100 % | HEART RATE: 74 BPM | DIASTOLIC BLOOD PRESSURE: 83 MMHG | TEMPERATURE: 98 F | WEIGHT: 293 LBS | RESPIRATION RATE: 18 BRPM | HEIGHT: 69 IN | BODY MASS INDEX: 43.4 KG/M2

## 2023-12-28 LAB
FLUAV AG UPPER RESP QL IA.RAPID: NOT DETECTED
FLUBV AG UPPER RESP QL IA.RAPID: NOT DETECTED
SARS-COV-2 RNA RESP QL NAA+PROBE: NOT DETECTED
STREP A PCR (OHS): NOT DETECTED

## 2023-12-28 PROCEDURE — 0240U COVID/FLU A&B PCR: CPT | Performed by: PHYSICIAN ASSISTANT

## 2023-12-28 PROCEDURE — 87651 STREP A DNA AMP PROBE: CPT | Performed by: PHYSICIAN ASSISTANT

## 2023-12-28 PROCEDURE — 99900041 HC LEFT WITHOUT BEING SEEN- EMERGENCY

## 2024-03-08 ENCOUNTER — HOSPITAL ENCOUNTER (EMERGENCY)
Facility: HOSPITAL | Age: 39
Discharge: HOME | End: 2024-03-08
Attending: EMERGENCY MEDICINE
Payer: MEDICAID

## 2024-03-08 VITALS
SYSTOLIC BLOOD PRESSURE: 103 MMHG | WEIGHT: 293 LBS | TEMPERATURE: 98 F | RESPIRATION RATE: 18 BRPM | BODY MASS INDEX: 43.4 KG/M2 | DIASTOLIC BLOOD PRESSURE: 71 MMHG | HEIGHT: 69 IN | OXYGEN SATURATION: 96 % | HEART RATE: 77 BPM

## 2024-03-08 DIAGNOSIS — L03.119 CELLULITIS OF LOWER EXTREMITY, UNSPECIFIED LATERALITY: Primary | ICD-10-CM

## 2024-03-08 LAB
ALBUMIN SERPL-MCNC: 3.3 G/DL (ref 3.5–5)
ALBUMIN/GLOB SERPL: 0.9 RATIO (ref 1.1–2)
ALP SERPL-CCNC: 62 UNIT/L (ref 40–150)
ALT SERPL-CCNC: 11 UNIT/L (ref 0–55)
AST SERPL-CCNC: 12 UNIT/L (ref 5–34)
BASOPHILS # BLD AUTO: 0.04 X10(3)/MCL
BASOPHILS NFR BLD AUTO: 0.5 %
BILIRUB SERPL-MCNC: 0.3 MG/DL
BUN SERPL-MCNC: 17 MG/DL (ref 7–18.7)
CALCIUM SERPL-MCNC: 9.2 MG/DL (ref 8.4–10.2)
CHLORIDE SERPL-SCNC: 108 MMOL/L (ref 98–107)
CO2 SERPL-SCNC: 24 MMOL/L (ref 22–29)
CREAT SERPL-MCNC: 0.97 MG/DL (ref 0.55–1.02)
EOSINOPHIL # BLD AUTO: 0.19 X10(3)/MCL (ref 0–0.9)
EOSINOPHIL NFR BLD AUTO: 2.5 %
ERYTHROCYTE [DISTWIDTH] IN BLOOD BY AUTOMATED COUNT: 15.2 % (ref 11.5–17)
GFR SERPLBLD CREATININE-BSD FMLA CKD-EPI: >60 MLS/MIN/1.73/M2
GLOBULIN SER-MCNC: 3.5 GM/DL (ref 2.4–3.5)
GLUCOSE SERPL-MCNC: 71 MG/DL (ref 74–100)
HCT VFR BLD AUTO: 35.7 % (ref 37–47)
HGB BLD-MCNC: 11.4 G/DL (ref 12–16)
IMM GRANULOCYTES # BLD AUTO: 0.02 X10(3)/MCL (ref 0–0.04)
IMM GRANULOCYTES NFR BLD AUTO: 0.3 %
LYMPHOCYTES # BLD AUTO: 2.12 X10(3)/MCL (ref 0.6–4.6)
LYMPHOCYTES NFR BLD AUTO: 27.7 %
MCH RBC QN AUTO: 29.8 PG (ref 27–31)
MCHC RBC AUTO-ENTMCNC: 31.9 G/DL (ref 33–36)
MCV RBC AUTO: 93.5 FL (ref 80–94)
MONOCYTES # BLD AUTO: 0.61 X10(3)/MCL (ref 0.1–1.3)
MONOCYTES NFR BLD AUTO: 8 %
NEUTROPHILS # BLD AUTO: 4.68 X10(3)/MCL (ref 2.1–9.2)
NEUTROPHILS NFR BLD AUTO: 61 %
NRBC BLD AUTO-RTO: 0 %
PLATELET # BLD AUTO: 310 X10(3)/MCL (ref 130–400)
PMV BLD AUTO: 10.3 FL (ref 7.4–10.4)
POTASSIUM SERPL-SCNC: 4.3 MMOL/L (ref 3.5–5.1)
PROT SERPL-MCNC: 6.8 GM/DL (ref 6.4–8.3)
RBC # BLD AUTO: 3.82 X10(6)/MCL (ref 4.2–5.4)
SODIUM SERPL-SCNC: 139 MMOL/L (ref 136–145)
WBC # SPEC AUTO: 7.66 X10(3)/MCL (ref 4.5–11.5)

## 2024-03-08 PROCEDURE — 63600175 PHARM REV CODE 636 W HCPCS: Performed by: EMERGENCY MEDICINE

## 2024-03-08 PROCEDURE — 96374 THER/PROPH/DIAG INJ IV PUSH: CPT

## 2024-03-08 PROCEDURE — 99284 EMERGENCY DEPT VISIT MOD MDM: CPT | Mod: 25

## 2024-03-08 PROCEDURE — 80053 COMPREHEN METABOLIC PANEL: CPT | Performed by: EMERGENCY MEDICINE

## 2024-03-08 PROCEDURE — 85025 COMPLETE CBC W/AUTO DIFF WBC: CPT | Performed by: EMERGENCY MEDICINE

## 2024-03-08 RX ORDER — CEPHALEXIN 500 MG/1
500 CAPSULE ORAL EVERY 6 HOURS
Qty: 40 CAPSULE | Refills: 0 | Status: SHIPPED | OUTPATIENT
Start: 2024-03-08 | End: 2024-03-18

## 2024-03-08 RX ORDER — BREXPIPRAZOLE 1 MG/1
1 TABLET ORAL
COMMUNITY

## 2024-03-08 RX ORDER — CEFAZOLIN SODIUM 1 G/3ML
1 INJECTION, POWDER, FOR SOLUTION INTRAMUSCULAR; INTRAVENOUS
Status: COMPLETED | OUTPATIENT
Start: 2024-03-08 | End: 2024-03-08

## 2024-03-08 RX ADMIN — CEFAZOLIN 1 G: 330 INJECTION, POWDER, FOR SOLUTION INTRAMUSCULAR; INTRAVENOUS at 12:03

## 2024-03-08 NOTE — ED PROVIDER NOTES
Encounter Date: 3/8/2024       History     Chief Complaint   Patient presents with    Rash     C/o rash to left lower leg for 5 days. Had cellulitis in the past and states it looks the same. Denies fever. Ambulatory.      38-year-old female history of seizure, bipolar presents with 5 days of bilateral lower extremity redness.  No fever or chills.  Patient has had similar symptoms in the past and was admitted for cellulitis.  Patient does not have diabetes.  No injuries.  No calf pain or recent procedures.  No long travel.  Patient denies chronic lower extremity swelling as well    The history is provided by the patient.     Review of patient's allergies indicates:  No Known Allergies  Past Medical History:   Diagnosis Date    Bipolar affective disorder     Cellulitis     Kidney stones     Seizures      Past Surgical History:   Procedure Laterality Date    CLOSED REDUCTION, FINGER, WITH PINNING Right 5/12/2023    Procedure: CLOSED REDUCTION, FINGER, WITH PINNING (ORIF vs percutaneous fixation right middle finger middle phalanx fracture, K-WIRE  AND JED HAND SET NEEDED);  Surgeon: Suleman Livingston MD;  Location: [facility];  Service: Plastics;  Laterality: Right;    CYSTOSCOPY      with stent placement    EYE SURGERY      for strabismus    LAPAROSCOPIC CHOLECYSTECTOMY      TONSILLECTOMY      TYMPANOSTOMY TUBE PLACEMENT       Family History   Problem Relation Age of Onset    No Known Problems Mother     No Known Problems Father      Social History     Tobacco Use    Smoking status: Every Day     Current packs/day: 0.50     Types: Cigarettes    Smokeless tobacco: Never   Substance Use Topics    Alcohol use: Never    Drug use: Never     Review of Systems   All other systems reviewed and are negative.      Physical Exam     Initial Vitals [03/08/24 1047]   BP Pulse Resp Temp SpO2   129/83 74 18 98.1 °F (36.7 °C) 100 %      MAP       --         Physical Exam    Nursing note and vitals reviewed.  Constitutional:  She appears well-developed and well-nourished.   Musculoskeletal:         General: Normal range of motion.      Comments: 2+ bilateral DP pulses     Neurological: She is alert and oriented to person, place, and time. She has normal strength.   Skin:   +circumferential redness and warmth to bilateral lower extremities adjacent to ankles.  No open wounds or induration.  No drainage.          ED Course   Procedures  Labs Reviewed   COMPREHENSIVE METABOLIC PANEL - Abnormal; Notable for the following components:       Result Value    Chloride 108 (*)     Glucose Level 71 (*)     Albumin Level 3.3 (*)     Albumin/Globulin Ratio 0.9 (*)     All other components within normal limits   CBC WITH DIFFERENTIAL - Abnormal; Notable for the following components:    RBC 3.82 (*)     Hgb 11.4 (*)     Hct 35.7 (*)     MCHC 31.9 (*)     All other components within normal limits   CBC W/ AUTO DIFFERENTIAL    Narrative:     The following orders were created for panel order CBC auto differential.  Procedure                               Abnormality         Status                     ---------                               -----------         ------                     CBC with Differential[[id number]]       Abnormal            Final result                 Please view results for these tests on the individual orders.          Imaging Results    None          Medications   ceFAZolin injection 1 g (1 g Intravenous Given 3/8/24 1238)     Medical Decision Making  Differential diagnosis: Cellulitis, abscess, DVT, venous stasis dermatitis,    Amount and/or Complexity of Data Reviewed  Labs: ordered. Decision-making details documented in ED Course.  Discussion of management or test interpretation with external provider(s): 38-year-old female presents with redness, warmth and pain to bilateral ankles.  Patient given Ancef in the ER I will be sent home with cephalexin.  Low suspicion for MRSA    Risk  Prescription drug management.               ED  Course as of 03/08/24 1342   Fri Mar 08, 2024   1330 WBC: 7.66 [WC]   1332 Glucose(!): 71 [WC]      ED Course User Index  [WC] Keith Gonzalez MD                           Clinical Impression:  Final diagnoses:  [L03.119] Cellulitis of lower extremity, unspecified laterality (Primary)          ED Disposition Condition    Discharge Stable          ED Prescriptions       Medication Sig Dispense Start Date End Date Auth. Provider    cephALEXin (KEFLEX) 500 MG capsule Take 1 capsule (500 mg total) by mouth every 6 (six) hours. for 10 days 40 capsule 3/8/2024 3/18/2024 Keith Gonzalez MD          Follow-up Information       Follow up With Specialties Details Why Contact Info    Lindsay Coon NP Family Medicine Call in 1 week  [address] [facility] [id number]               Keith Gonzalez MD  03/08/24 2692

## 2024-03-08 NOTE — ED TRIAGE NOTES
Rash C/o rash to left lower leg for 5 days. Had cellulitis in the past and states it looks the same. Denies fever. Ambulatory.

## 2024-10-07 ENCOUNTER — HOSPITAL ENCOUNTER (EMERGENCY)
Facility: HOSPITAL | Age: 39
Discharge: HOME | End: 2024-10-07
Attending: FAMILY MEDICINE
Payer: MEDICAID

## 2024-10-07 VITALS
RESPIRATION RATE: 18 BRPM | DIASTOLIC BLOOD PRESSURE: 69 MMHG | TEMPERATURE: 98 F | HEIGHT: 69 IN | SYSTOLIC BLOOD PRESSURE: 117 MMHG | OXYGEN SATURATION: 99 % | BODY MASS INDEX: 43.4 KG/M2 | WEIGHT: 293 LBS | HEART RATE: 82 BPM

## 2024-10-07 DIAGNOSIS — L03.90 CELLULITIS: ICD-10-CM

## 2024-10-07 DIAGNOSIS — L03.116 CELLULITIS OF LEFT LOWER EXTREMITY WITHOUT FOOT: Primary | ICD-10-CM

## 2024-10-07 LAB
ALBUMIN SERPL-MCNC: 3.3 G/DL (ref 3.5–5)
ALBUMIN/GLOB SERPL: 1 RATIO (ref 1.1–2)
ALP SERPL-CCNC: 56 UNIT/L (ref 40–150)
ALT SERPL-CCNC: 13 UNIT/L (ref 0–55)
ANION GAP SERPL CALC-SCNC: 8 MEQ/L
AST SERPL-CCNC: 17 UNIT/L (ref 5–34)
BASOPHILS # BLD AUTO: 0.04 X10(3)/MCL
BASOPHILS NFR BLD AUTO: 0.5 %
BILIRUB SERPL-MCNC: 0.2 MG/DL
BUN SERPL-MCNC: 14.8 MG/DL (ref 7–18.7)
CALCIUM SERPL-MCNC: 9 MG/DL (ref 8.4–10.2)
CHLORIDE SERPL-SCNC: 110 MMOL/L (ref 98–107)
CO2 SERPL-SCNC: 21 MMOL/L (ref 22–29)
CREAT SERPL-MCNC: 1.04 MG/DL (ref 0.55–1.02)
CREAT/UREA NIT SERPL: 14
CRP SERPL-MCNC: 18 MG/L
EOSINOPHIL # BLD AUTO: 0.19 X10(3)/MCL (ref 0–0.9)
EOSINOPHIL NFR BLD AUTO: 2.3 %
ERYTHROCYTE [DISTWIDTH] IN BLOOD BY AUTOMATED COUNT: 14.4 % (ref 11.5–17)
ERYTHROCYTE [SEDIMENTATION RATE] IN BLOOD: 40 MM/HR (ref 0–20)
GFR SERPLBLD CREATININE-BSD FMLA CKD-EPI: >60 ML/MIN/1.73/M2
GLOBULIN SER-MCNC: 3.3 GM/DL (ref 2.4–3.5)
GLUCOSE SERPL-MCNC: 84 MG/DL (ref 74–100)
HCT VFR BLD AUTO: 36.4 % (ref 37–47)
HGB BLD-MCNC: 11.6 G/DL (ref 12–16)
IMM GRANULOCYTES # BLD AUTO: 0.03 X10(3)/MCL (ref 0–0.04)
IMM GRANULOCYTES NFR BLD AUTO: 0.4 %
LACTATE SERPL-SCNC: 1.1 MMOL/L (ref 0.5–2.2)
LYMPHOCYTES # BLD AUTO: 1.79 X10(3)/MCL (ref 0.6–4.6)
LYMPHOCYTES NFR BLD AUTO: 21.2 %
MAGNESIUM SERPL-MCNC: 2.1 MG/DL (ref 1.6–2.6)
MCH RBC QN AUTO: 29.8 PG (ref 27–31)
MCHC RBC AUTO-ENTMCNC: 31.9 G/DL (ref 33–36)
MCV RBC AUTO: 93.6 FL (ref 80–94)
MONOCYTES # BLD AUTO: 0.62 X10(3)/MCL (ref 0.1–1.3)
MONOCYTES NFR BLD AUTO: 7.3 %
NEUTROPHILS # BLD AUTO: 5.77 X10(3)/MCL (ref 2.1–9.2)
NEUTROPHILS NFR BLD AUTO: 68.3 %
NRBC BLD AUTO-RTO: 0 %
PLATELET # BLD AUTO: 317 X10(3)/MCL (ref 130–400)
PMV BLD AUTO: 9.8 FL (ref 7.4–10.4)
POTASSIUM SERPL-SCNC: 4.2 MMOL/L (ref 3.5–5.1)
PROT SERPL-MCNC: 6.6 GM/DL (ref 6.4–8.3)
RBC # BLD AUTO: 3.89 X10(6)/MCL (ref 4.2–5.4)
SODIUM SERPL-SCNC: 139 MMOL/L (ref 136–145)
WBC # BLD AUTO: 8.44 X10(3)/MCL (ref 4.5–11.5)

## 2024-10-07 PROCEDURE — 83605 ASSAY OF LACTIC ACID: CPT | Performed by: FAMILY MEDICINE

## 2024-10-07 PROCEDURE — 85652 RBC SED RATE AUTOMATED: CPT | Performed by: FAMILY MEDICINE

## 2024-10-07 PROCEDURE — 99284 EMERGENCY DEPT VISIT MOD MDM: CPT | Mod: 25

## 2024-10-07 PROCEDURE — 83735 ASSAY OF MAGNESIUM: CPT | Performed by: FAMILY MEDICINE

## 2024-10-07 PROCEDURE — 85025 COMPLETE CBC W/AUTO DIFF WBC: CPT | Performed by: FAMILY MEDICINE

## 2024-10-07 PROCEDURE — 86140 C-REACTIVE PROTEIN: CPT | Performed by: FAMILY MEDICINE

## 2024-10-07 PROCEDURE — 25000003 PHARM REV CODE 250: Performed by: FAMILY MEDICINE

## 2024-10-07 PROCEDURE — 80053 COMPREHEN METABOLIC PANEL: CPT | Performed by: FAMILY MEDICINE

## 2024-10-07 RX ORDER — CEPHALEXIN 250 MG/1
500 CAPSULE ORAL
Status: COMPLETED | OUTPATIENT
Start: 2024-10-07 | End: 2024-10-07

## 2024-10-07 RX ORDER — CEPHALEXIN 500 MG/1
500 CAPSULE ORAL EVERY 6 HOURS
Qty: 40 CAPSULE | Refills: 0 | Status: SHIPPED | OUTPATIENT
Start: 2024-10-07 | End: 2024-10-17

## 2024-10-07 RX ADMIN — CEPHALEXIN 500 MG: 250 CAPSULE ORAL at 08:10

## 2024-10-07 NOTE — ED PROVIDER NOTES
Encounter Date: 10/7/2024       History     Chief Complaint   Patient presents with    Foot Pain     Pt to er c/o pain and swelling to left foot onset three days ago. Large amount of redness also noted to left lower leg.     39-year-old female presents emergency room complaints of redness and swelling to the left lower extremity that has been present for the last 3 days.  Patient reports that she was seen outside emergency room yesterday, at which time she was giving antibiotics (Bactrim in his Z-Chilo) which she began yesterday.  Patient was still continuing to have redness swelling of the area, therefore decided come the emergency room today for evaluation.  Patient denies fever chills.  Denies abdominal pain nausea or vomiting.    The history is provided by the patient and a relative.     Review of patient's allergies indicates:  No Known Allergies  Past Medical History:   Diagnosis Date    Bipolar affective disorder     Cellulitis     Kidney stones     Seizures      Past Surgical History:   Procedure Laterality Date    CLOSED REDUCTION, FINGER, WITH PINNING Right 5/12/2023    Procedure: CLOSED REDUCTION, FINGER, WITH PINNING (ORIF vs percutaneous fixation right middle finger middle phalanx fracture, K-WIRE  AND JED HAND SET NEEDED);  Surgeon: Suleman Livingston MD;  Location: [facility];  Service: Plastics;  Laterality: Right;    CYSTOSCOPY      with stent placement    EYE SURGERY      for strabismus    LAPAROSCOPIC CHOLECYSTECTOMY      TONSILLECTOMY      TYMPANOSTOMY TUBE PLACEMENT       Family History   Problem Relation Name Age of Onset    No Known Problems Mother      No Known Problems Father       Social History     Tobacco Use    Smoking status: Every Day     Current packs/day: 0.50     Types: Cigarettes    Smokeless tobacco: Never   Substance Use Topics    Alcohol use: Never    Drug use: Never     Review of Systems   Constitutional:  Negative for chills, fatigue and fever.   HENT:  Negative for ear  pain, rhinorrhea and sore throat.    Eyes:  Negative for photophobia and pain.   Respiratory:  Negative for cough, shortness of breath and wheezing.    Cardiovascular:  Negative for chest pain.   Gastrointestinal:  Negative for abdominal pain, diarrhea, nausea and vomiting.   Genitourinary:  Negative for dysuria.   Neurological:  Negative for dizziness, weakness and headaches.   All other systems reviewed and are negative.      Physical Exam     Initial Vitals [10/07/24 1725]   BP Pulse Resp Temp SpO2   135/84 89 (!) 0 98.2 °F (36.8 °C) 97 %      MAP       --         Physical Exam    Nursing note and vitals reviewed.  Constitutional: She appears well-developed and well-nourished. No distress.   HENT:   Head: Normocephalic and atraumatic. Mouth/Throat: Oropharynx is clear and moist.   Eyes: Conjunctivae and EOM are normal. Pupils are equal, round, and reactive to light.   Neck: Neck supple.   Normal range of motion.  Cardiovascular:  Normal rate, regular rhythm, normal heart sounds and intact distal pulses.     Exam reveals no gallop and no friction rub.       No murmur heard.  Pulmonary/Chest: Breath sounds normal. No respiratory distress. She has no wheezes. She has no rhonchi. She has no rales.   Abdominal: Abdomen is soft. Bowel sounds are normal. She exhibits no distension. There is no abdominal tenderness. There is no rebound and no guarding.   Musculoskeletal:         General: Normal range of motion.      Cervical back: Normal range of motion and neck supple.      Comments: 2+ dorsalis pedis pulses bilaterally     Neurological: She is alert and oriented to person, place, and time.   Skin: Skin is warm and dry. Capillary refill takes less than 2 seconds. No erythema.   Redness and soft tissue swelling consistent of erythema on the left lower anterior and lateral lower extremity consistent with cellulitis.  No fluctuance appreciated.  No significant induration.   Psychiatric: She has a normal mood and affect.  Her behavior is normal. Judgment and thought content normal.                     ED Course   Procedures  Labs Reviewed   COMPREHENSIVE METABOLIC PANEL - Abnormal       Result Value    Sodium 139      Potassium 4.2      Chloride 110 (*)     CO2 21 (*)     Glucose 84      Blood Urea Nitrogen 14.8      Creatinine 1.04 (*)     Calcium 9.0      Protein Total 6.6      Albumin 3.3 (*)     Globulin 3.3      Albumin/Globulin Ratio 1.0 (*)     Bilirubin Total 0.2      ALP 56      ALT 13      AST 17      eGFR >60      Anion Gap 8.0      BUN/Creatinine Ratio 14     SEDIMENTATION RATE, AUTOMATED - Abnormal    Sed Rate 40 (*)    C-REACTIVE PROTEIN - Abnormal    CRP 18.00 (*)    CBC WITH DIFFERENTIAL - Abnormal    WBC 8.44      RBC 3.89 (*)     Hgb 11.6 (*)     Hct 36.4 (*)     MCV 93.6      MCH 29.8      MCHC 31.9 (*)     RDW 14.4      Platelet 317      MPV 9.8      Neut % 68.3      Lymph % 21.2      Mono % 7.3      Eos % 2.3      Basophil % 0.5      Lymph # 1.79      Neut # 5.77      Mono # 0.62      Eos # 0.19      Baso # 0.04      IG# 0.03      IG% 0.4      NRBC% 0.0     MAGNESIUM - Normal    Magnesium Level 2.10     LACTIC ACID, PLASMA - Normal    Lactic Acid Level 1.1     CBC W/ AUTO DIFFERENTIAL    Narrative:     The following orders were created for panel order CBC Auto Differential.  Procedure                               Abnormality         Status                     ---------                               -----------         ------                     CBC with Differential[[id number]]       Abnormal            Final result                 Please view results for these tests on the individual orders.          Imaging Results              X-Ray Tibia Fibula 2 View Left (Final result)  Result time 10/07/24 19:42:22      Final result by Ihsan Her MD (10/07/24 19:42:22)                   Impression:      No acute osseous abnormality identified.      Electronically signed by: Ihsan  Her  Date:    10/07/2024  Time:    19:42               Narrative:    EXAMINATION:  XR TIBIA FIBULA 2 VIEW LEFT    CLINICAL HISTORY:  Cellulitis, unspecified    TECHNIQUE:  Two-view    COMPARISON:  October 18, 2021.    FINDINGS:  Articular surface alignment is preserved and no acute fracture or dislocation identified.  Mild degenerate arthritic changes involve the medial compartment left knee.  There are no erosive destructive changes.  Soft tissue inflammations.  Calcaneal enthesophyte.                                       Medications   cephALEXin capsule 500 mg (has no administration in time range)     Medical Decision Making  39-year-old female presents emergency room with left lower extremity pain and swelling.  Notable cellulitis appreciated.  On review of the medical record, patient was seen in the emergency room at [facility] in [place] on 10/05/2024 (2 days ago) she was noted to have cellulitis.  Patient was prescribed Bactrim DS and azithromycin (Z-Chilo).  Reported patient complaining of 3 day history of erythema pain and swelling of the lower extremity.  From this history, appears patient was symptoms began 5 days prior.  Patient had laboratory evaluation performed at that facility consisting of a CBC CMP ESR and CRP.  Laboratory evaluation performed from outside facility unavailable.    Will obtain CBC CMP CRP and ESR here in the emergency room along with x-ray.  Will continue to monitor     Differential diagnosis: Cellulitis left lower extremity    Amount and/or Complexity of Data Reviewed  Labs: ordered.  Radiology: ordered.               ED Course as of 10/07/24 2033   Mon Oct 07, 2024   2032 Discussed results with the patient family.  Stable for discharge to home.  Patient was currently on Bactrim and azithromycin, will add on Keflex for strep coverage.  Stable for discharge home.  ER precautions given for any acute worsening. [MW]      ED Course User Index  [MW] Kenny Rogel MD                            Clinical Impression:  Final diagnoses:  [L03.90] Cellulitis  [L03.116] Cellulitis of left lower extremity without foot (Primary)          ED Disposition Condition    Discharge Stable          ED Prescriptions       Medication Sig Dispense Start Date End Date Auth. Provider    cephALEXin (KEFLEX) 500 MG capsule Take 1 capsule (500 mg total) by mouth every 6 (six) hours. for 10 days 40 capsule 10/7/2024 10/17/2024 Kenny Rogel MD          Follow-up Information       Follow up With Specialties Details Why Contact Info    Lindsay Coon, NP Family Medicine   [address] [facility] [id number]      [facility] - Emergency Dept Emergency Medicine  As needed, If symptoms worsen [id number]  [facility] [id number] [phone]             Kenny Rogel MD  10/07/24 2033

## (undated) DEVICE — Device

## (undated) DEVICE — TOURNIQUET SB QC SP 34X4IN

## (undated) DEVICE — DRAPE STERI U-SHAPED 47X51IN

## (undated) DEVICE — DRAPE HAND STERILE

## (undated) DEVICE — SUPPORT ULNA NERVE PROTECTOR

## (undated) DEVICE — GOWN SMARTSLEEVE XXL/XLONG

## (undated) DEVICE — COVER PIN STEINMAN YELLOW

## (undated) DEVICE — DRESSING XEROFORM FOIL PK 1X8

## (undated) DEVICE — APPLICATOR CHLORAPREP ORN 26ML

## (undated) DEVICE — SOL NACL IRR 1000ML BTL

## (undated) DEVICE — ELECTRODE PATIENT RETURN DISP

## (undated) DEVICE — BANDAGE ESMARK 4INX3YD

## (undated) DEVICE — NDL COLORADO STR SLV 45ANG 5IN